# Patient Record
Sex: FEMALE | Race: WHITE | Employment: OTHER | URBAN - METROPOLITAN AREA
[De-identification: names, ages, dates, MRNs, and addresses within clinical notes are randomized per-mention and may not be internally consistent; named-entity substitution may affect disease eponyms.]

---

## 2020-07-09 ENCOUNTER — OFFICE VISIT (OUTPATIENT)
Dept: PRIMARY CARE CLINIC | Age: 65
End: 2020-07-09
Payer: MEDICARE

## 2020-07-09 PROCEDURE — 99211 OFF/OP EST MAY X REQ PHY/QHP: CPT | Performed by: FAMILY MEDICINE

## 2020-07-09 NOTE — PROGRESS NOTES
Fili Chu received a viral test for COVID-19. They were educated on isolation and quarantine as appropriate. For any symptoms, they were directed to seek care from their PCP, given contact information to establish with a doctor, directed to an urgent care or the emergency room.

## 2020-07-13 LAB
SARS-COV-2: NOT DETECTED
SOURCE: NORMAL

## 2020-11-04 ENCOUNTER — TELEPHONE (OUTPATIENT)
Dept: SURGERY | Age: 65
End: 2020-11-04

## 2020-11-04 NOTE — TELEPHONE ENCOUNTER
New Patient referred by Dr. Pippa Davalos  lymph node dissection      Pt has appt on Tuesday November 10  She has a few questions regarding the appt and scheduling surgery-she was told this was a fast growing, concerning melanoma and would like to know if she can speak with Melina Kimbrough

## 2020-11-06 ENCOUNTER — VIRTUAL VISIT (OUTPATIENT)
Dept: SURGERY | Age: 65
End: 2020-11-06
Payer: MEDICARE

## 2020-11-06 PROCEDURE — G8400 PT W/DXA NO RESULTS DOC: HCPCS | Performed by: SURGERY

## 2020-11-06 PROCEDURE — G8428 CUR MEDS NOT DOCUMENT: HCPCS | Performed by: SURGERY

## 2020-11-06 PROCEDURE — 1090F PRES/ABSN URINE INCON ASSESS: CPT | Performed by: SURGERY

## 2020-11-06 PROCEDURE — 1123F ACP DISCUSS/DSCN MKR DOCD: CPT | Performed by: SURGERY

## 2020-11-06 PROCEDURE — 3017F COLORECTAL CA SCREEN DOC REV: CPT | Performed by: SURGERY

## 2020-11-06 PROCEDURE — 99205 OFFICE O/P NEW HI 60 MIN: CPT | Performed by: SURGERY

## 2020-11-06 PROCEDURE — 4040F PNEUMOC VAC/ADMIN/RCVD: CPT | Performed by: SURGERY

## 2020-11-06 RX ORDER — MULTIVITAMIN
1 TABLET ORAL DAILY
COMMUNITY

## 2020-11-06 RX ORDER — MULTIVITAMIN WITH IRON
250 TABLET ORAL DAILY
COMMUNITY

## 2020-11-06 NOTE — PROGRESS NOTES
Marlin Calvillo MD  Surgical Oncology    Date of service: 2020  Shannan Estrada     TELEHEALTH EVALUATION -- Audio/Visual (During IGMMX-87 public health emergency)    HPI:    Shannan Estrada (:  1955) has requested an audio/video evaluation for the following concern(s): Malignant Melanoma    Reason for Consult: Dr Migue Casarez asked me to see Shannan Estrada for evaluation of melanoma of the right upper back. Present Illness: Patient is a 76 y.o.  female presents to her dermatologist for a TBSE. The lesion was approximately the size of a pimple and has been present for at least a month. It has recently increased in size. It has not been bleeding. The lesion has not been pruritic. Patient does not have any other lesions of concern. Biopsy of the lesion was positive for melanoma. No personal or family history of melanoma. Pattricia Pole states not having significant subcutaneous mass, swelling in the neck or axiila, cough, abdominal pain, jaundice, blood in stools, headaches, recent neurological changes or bone pain to indicate any metastatic disease.     Past Medical History:   Diagnosis Date    Hyperlipidemia     Rheumatoid arthritis ([address]      Past Surgical History:   Procedure Laterality Date    EYE SURGERY      cataracts with IOL     Social  Social History     Tobacco Use    Smoking status: Former Smoker     Packs/day: 0.50     Years: 5.00     Pack years: 2.50    Smokeless tobacco: Never Used   Substance Use Topics    Alcohol use: Yes     Comment: 2-5 BEERS PER WEEK    Drug use: No     Family History   Problem Relation Age of Onset    Heart Disease Mother     Cancer Father     Heart Disease Father     High Blood Pressure Sister     High Blood Pressure Brother      Allergies: Sulfa antibiotics and Clindamycin/lincomycin  Current Outpatient Medications   Medication Sig Dispense Refill    magnesium (MAGNESIUM-OXIDE) 250 MG TABS tablet Take 250 mg by mouth daily      Multiple Vitamin (MULTIVITAMIN) The discussion included description of Breslow thickness, melanoma staging and the difference between Breslow thickness and staging. The discussion of the treatment included the techniques necessary for resection or wide local excision and sentinel node biopsy as well as the appropriate applications. These discussions were illustrated with diagrams and the patient was given a printed handout that summarizes the complete discussion. It was felt that this discussion was necessary because of the anxiety induced by the diagnosis of melanoma and the complex information that the patient receive when they attempt to educate themselves by the public sources and/or the internet. The patient was also instructed to avoid excessive sun exposure in the future. Specific techniques for doing this based on the Tayla measures recommended the by [facility] Melanoma [facility] were given. The lesion can be treated with: Wide Excision and [place] Lymph Node Biopsy under General anesthesia. I explained patient about the surgery. All the complications including wound issues were explained. Risks, benefits and alternatives of surgery explained to the patient and patient wishes to proceed with surgery. Adjuvant management will depend on final pathology. All the questions of the patient are answered to her apparent satisfaction. Patient verbalized understanding of the management plan. Pathology reviewed with the patient. I am going to obtain ultrasound of axilla and neck given thickness of melanoma as any positive lymph nodes will change her management. Follow up with Dr. Jennifer Hall. Florentino Church MD  Surgery Attending     An electronic signature was used to authenticate this note.

## 2020-11-06 NOTE — LETTER
[facility]) Surgical Oncology and General Surgery   [address] [place] [address] [phone] f [phone] MD Cristal    SURGERY ORDER -- 20    [facility]ility:  [facility] # _________________                                  Scheduled by: ____________    Surgery Date & Time:   08:30                                           Nuc Med / Inj. - or - Needle/Seed Loc:                    Pt arrival:  06:30 for Given Lymph Node Biopsy    Patient Name: Sangeetha Rudd             :                1955           [facility]:                 [facility]:         [phone] (home)                                                     PROCEDURE:  Back melanoma wide excision and sentinel lymph node excision [id number]    DIAGNOSIS:     ICD-10-CM    1. Malignant melanoma of torso excluding breast ([facility]  C43.59     Right Upper Back     Anesthesia: _GA_ Time Needed: _90 mins_Pt Position: _supine and then prone_         Outpatient _Y_ Admit __  Assist._____  Pre-Op H & P to be done by: PCP     Labs Needed: CBC [] PT/PTT []  INR [] CMP [] EKG [] CXR [] Urine Hcg []     Cardiac Clearance ___  (if Caridad Severs to be done by) __________________    Medications to be stopped 5 days before surgery: None    Additional/Special Orders:    Ancef 2gm IV david Hamilton MD  2020 11:17 AM

## 2020-11-11 ENCOUNTER — HOSPITAL ENCOUNTER (OUTPATIENT)
Dept: ULTRASOUND IMAGING | Age: 65
Discharge: HOME | End: 2020-11-11
Payer: MEDICARE

## 2020-11-11 PROCEDURE — 76882 US LMTD JT/FCL EVL NVASC XTR: CPT

## 2020-11-11 PROCEDURE — 76536 US EXAM OF HEAD AND NECK: CPT

## 2020-11-16 NOTE — PROGRESS NOTES
Joan Fuentes is here to discuss further management of her Invasive Malignant Melanoma of the right upper back and to review recent imaging studies. Patient is followed by Dr. Anthony Baptiste. Patient states new area of concern to her back that has recently started hurting. Denies fever, cough. Headache, nausea and vomiting. No blood in stool. currently. No other complaints. Review of systems is otherwise negative    Past medical history, Past surgical history, Social history, Family history and allergies reviewed and updated. Vitals:    11/17/20 0948   BP: 134/78   Pulse: 68   Resp: 16   Temp: 97 °F (36.1 °C)   SpO2: 98%   Weight: 117 lb (53.1 kg)   Height: 5' 5\" (1.651 m)     Wt Readings from Last 3 Encounters:   11/17/20 117 lb (53.1 kg)   03/12/12 120 lb (54.4 kg)   03/08/12 120 lb (54.4 kg)     Body mass index is 19.47 kg/m². O/E:   Constitutional: Patient is oriented to person, place, and time. No distress. HENT: mucus membranes - moist. No scleral icterus. Neck: Supple and normal range of motion. No lymphadenopathy present. Pulmonary/Chest: Effort normal. No respiratory distress. Abdominal: Soft. No distension and no mass. No tenderness. No Hepatosplenomegaly. Extremities: no edema and no tenderness. Neurological: Grossly intact motor and sensory exam  Skin: Skin is warm and dry. No rash noted. She is not diaphoretic. Biopsy site on back. Psychiatric: She has a normal mood and affect. Her behavior is normal. Judgment and thought content normal.     US EXTREMITY RIGHT / US HEAD NECK SOFT TISSUE THYROID  11/11/20:     1. Right axilla: Normal size and borderline size lymph nodes with normal fatty hilum, none appear to be pathologically enlarged. No biopsy recommended at this time.       2. Right neck soft tissues: Borderline, normal size right neck nodes and no enlarged node. No biopsy recommended at this time.      Pathology:     Thickness: at least 1.8mm   Level: at least Level IV  Ulceration: no Regression: no    Mitotic rate: None Identified     Impression: Invasive Malignant Melanoma     At Least pTa, N0, M0  melanoma of the right upper back. A/P:    Diagnosis Orders   1. Malignant melanoma of torso excluding breast (HCC)       Ultrasound normal. We will proceed with lymph node excision. Plan:    Wide Excision and [place] Lymph Node Biopsy under General anesthesia. I explained patient about the surgery again. All the complications including wound issues were explained. Risks, benefits and alternatives of surgery explained to the patient and patient wishes to proceed with surgery. Adjuvant management will depend on final pathology. All the questions of the patient are answered to her apparent satisfaction. Patient verbalized understanding of the management plan. Discussed possible graft placement. Pathology reviewed with the patient. Follow up with Dr. Jermaine Hodge. Multiple questions answered    Conor Pelletier MD  Surgery Attending    I, Kay Campbell RN, am scribing for and in the presence of Dr Conor Pelletier. Kay Campbell RN    I, Dr. Conor Pelletier, personally performed the services described in this documentation as scribed by Kay Campbell RN in my presence, and it is both accurate and complete.      Conor Pelletier MD  Surgery Attending

## 2020-11-17 ENCOUNTER — TELEPHONE (OUTPATIENT)
Dept: SURGERY | Age: 65
End: 2020-11-17

## 2020-11-17 ENCOUNTER — OFFICE VISIT (OUTPATIENT)
Dept: SURGERY | Age: 65
End: 2020-11-17
Payer: MEDICARE

## 2020-11-17 VITALS
HEART RATE: 68 BPM | OXYGEN SATURATION: 98 % | WEIGHT: 117 LBS | TEMPERATURE: 97 F | RESPIRATION RATE: 16 BRPM | BODY MASS INDEX: 19.49 KG/M2 | SYSTOLIC BLOOD PRESSURE: 134 MMHG | HEIGHT: 65 IN | DIASTOLIC BLOOD PRESSURE: 78 MMHG

## 2020-11-17 PROCEDURE — 1090F PRES/ABSN URINE INCON ASSESS: CPT | Performed by: SURGERY

## 2020-11-17 PROCEDURE — G8484 FLU IMMUNIZE NO ADMIN: HCPCS | Performed by: SURGERY

## 2020-11-17 PROCEDURE — 3017F COLORECTAL CA SCREEN DOC REV: CPT | Performed by: SURGERY

## 2020-11-17 PROCEDURE — 4040F PNEUMOC VAC/ADMIN/RCVD: CPT | Performed by: SURGERY

## 2020-11-17 PROCEDURE — 1036F TOBACCO NON-USER: CPT | Performed by: SURGERY

## 2020-11-17 PROCEDURE — G8427 DOCREV CUR MEDS BY ELIG CLIN: HCPCS | Performed by: SURGERY

## 2020-11-17 PROCEDURE — 99213 OFFICE O/P EST LOW 20 MIN: CPT | Performed by: SURGERY

## 2020-11-17 PROCEDURE — G8420 CALC BMI NORM PARAMETERS: HCPCS | Performed by: SURGERY

## 2020-11-17 PROCEDURE — 1123F ACP DISCUSS/DSCN MKR DOCD: CPT | Performed by: SURGERY

## 2020-11-17 PROCEDURE — G8400 PT W/DXA NO RESULTS DOC: HCPCS | Performed by: SURGERY

## 2020-11-17 NOTE — TELEPHONE ENCOUNTER
Jonathon Savage, With Dr. Otto Perez office, would like to know if Dr. Georgia Sullivan needs to order an EKG or blood work pre op.     If blood work is needed they need to know what kind and they will get orders in     Please call Dr. Otto Perez office back at [phone]

## 2020-11-23 ENCOUNTER — TELEPHONE (OUTPATIENT)
Dept: SURGERY | Age: 65
End: 2020-11-23

## 2020-11-25 ENCOUNTER — TELEPHONE (OUTPATIENT)
Dept: SURGERY | Age: 65
End: 2020-11-25

## 2020-11-25 DIAGNOSIS — C43.59 MALIGNANT MELANOMA OF TORSO EXCLUDING BREAST (HCC): Primary | ICD-10-CM

## 2020-11-25 NOTE — PROGRESS NOTES
[facility] is here to discuss further management of her Invasive Malignant Melanoma of the right upper back and to review recent imaging studies. Patient is followed by Dr. Epi Stein. Patient states new area of concern to her back that has recently started hurting. Denies fever, cough. Headache, nausea and vomiting. No blood in stool. currently. No other complaints. Review of systems is otherwise negative    Past medical history, Past surgical history, Social history, Family history and allergies reviewed and updated. There were no vitals filed for this visit. Wt Readings from Last 3 Encounters:   11/17/20 117 lb (53.1 kg)   03/12/12 120 lb (54.4 kg)   03/08/12 120 lb (54.4 kg)     There is no height or weight on file to calculate BMI. O/E:   Constitutional: Patient is oriented to person, place, and time. No distress. HENT: mucus membranes - moist. No scleral icterus. Neck: Supple and normal range of motion. No lymphadenopathy present. Pulmonary/Chest: Effort normal. No respiratory distress. Abdominal: Soft. No distension and no mass. No tenderness. No Hepatosplenomegaly. Extremities: no edema and no tenderness. Neurological: Grossly intact motor and sensory exam  Skin: Skin is warm and dry. No rash noted. She is not diaphoretic. Psychiatric: She has a normal mood and affect. Her behavior is normal. Judgment and thought content normal.     US EXTREMITY RIGHT / US HEAD NECK SOFT TISSUE THYROID  11/11/20:     1. Right axilla: Normal size and borderline size lymph nodes with normal fatty hilum, none appear to be pathologically enlarged. No biopsy recommended at this time.       2. Right neck soft tissues: Borderline, normal size right neck nodes and no enlarged node. No biopsy recommended at this time.      Pathology:     Thickness: at least 1.8mm   Level: at least Level IV  Ulceration: no     Regression: no    Mitotic rate: None Identified     Impression: Invasive Malignant Melanoma     At Least pTa, and it is both accurate and complete.      Patrica Sandoval MD  Surgery Attending

## 2020-11-30 ENCOUNTER — NURSE ONLY (OUTPATIENT)
Dept: PRIMARY CARE CLINIC | Age: 65
End: 2020-11-30
Payer: MEDICARE

## 2020-11-30 PROCEDURE — 99211 OFF/OP EST MAY X REQ PHY/QHP: CPT | Performed by: NURSE PRACTITIONER

## 2020-11-30 NOTE — PROGRESS NOTES
[facility] / [address] [facility], [address]    Acknowledgment of Informed Consent for Surgical or Medical Procedure and Sedation  I agree to allow doctor(s) Aleyda Rodriguez and his/her associates or assistants, including residents and/or other qualified medical practitioner to perform the following medical treatment or procedure and to administer or direct the administration of sedation as necessary:  Procedure(s): Marlene Levin 99  My doctor has explained the following regarding the proposed procedure:   the explanation of the procedure   the benefits of the procedure   the potential problems that might occur during recuperation   the risks and side effects of the procedure which could include but are not limited to severe blood loss, infection, stroke or death   the benefits, risks and side effect of alternative procedures including the consequences of declining this procedure or any alternative procedures   the likelihood of achieving satisfactory results. I acknowledge no guarantee or assurance has been made to me regarding the results. I understand that during the course of this treatment/procedure, unforeseen conditions can occur which require an additional or different procedure. I agree to allow my physician or assistants to perform such extension of the original procedure as they may find necessary. I understand that sedation will often result in temporary impairment of memory and fine motor skills and that sedation can occasionally progress to a state of deep sedation or general anesthesia. I understand the risks of anesthesia for surgery include, but are not limited to, sore throat, hoarseness, injury to face, mouth, or teeth; nausea; headache; injury to blood vessels or nerves; death, brain damage, or paralysis.     I understand that if I have a Limitation of Treatment order in effect during my hospitalization, the order may or may not be in effect during this procedure. I give my doctor permission to give me blood or blood products. I understand that there are risks with receiving blood such as hepatitis, AIDS, fever, or allergic reaction. I acknowledge that the risks, benefits, and alternatives of this treatment have been explained to me and that no express or implied warranty has been given by the hospital, any blood bank, or any person or entity as to the blood or blood components transfused. At the discretion of my doctor, I agree to allow observers, equipment/product representatives and allow photographing, and/or televising of the procedure, provided my name or identity is maintained confidentially. I agree the hospital may dispose of or use for scientific or educational purposes any tissue, fluid, or body parts which may be removed.     ________________________________Date________Time______ am/pm  ([place] [facility])  Patient or Signature of Closest Relative or Legal Guardian    ________________________________Date________Time______am/pm      Page 1 of  1  Witness

## 2020-12-01 LAB — SARS-COV-2: NOT DETECTED

## 2020-12-02 NOTE — PROGRESS NOTES
[facility] patients having surgery or anesthesia are required to be Covid tested. You will need to quarantined from the time you are tested until your surgery. PRIOR TO PROCEDURE DATE:  1. Please follow any guidelines/instructions prior to your procedure as advised by your surgeon. 2. Arrange for someone to drive you home and be with you for the first 24 hours after discharge for your safety after your procedure for which you received sedation. Ensure it is someone we can share information with regarding your discharge. 3. You must contact your surgeon for instructions IF:   You are taking any blood thinners, aspirin, anti-inflammatory or vitamin E.   There is a change in your physical condition such as a cold, fever, rash, cuts, sores or any other infection, especially near your surgical site. 4. Do not drink alcohol the day before or day of your procedure. 5. A Pre-op History and Physical for surgery MUST be completed by your Physician or Urgent Care within 30 days of your procedure date. Please bring a copy with you on the day of your procedure and along with any other testing performed. THE DAY OF YOUR PROCEDURE:  1. Follow instructions for ARRIVAL TIME as DIRECTED BY YOUR SURGEON. I    2. Enter the MAIN entrance from [place] and follow the signs to the free [employer] or [employer] parking (offered free of charge 6am-5pm). 3. Enter the Main Entrance of the hospital (do not enter from the lower level of the parking garage). Upon entrance, check in with the  at the main desk on your left. If no one is available at the desk, proceed into the [facility] Waiting Room and go through the door directly into the [facility]. There is a Check-in desk ACROSS from Room 5 (marked with a sign hanging from the ceiling). The phone number for the surgery center is [phone].     4. Please call [phone] option #2 option #2 if you have not been preregistered yet. On the day of your procedure bring your insurance card and photo ID. You will be registered at your bedside once brought back to your room. 5. DO NOT EAT ANYTHING eight hours prior to surgery. May have 8 ounces of water 4 hours prior to surgery. 6. MEDICATIONS    Take the following medications with a SMALL sip of water:    Use your usual dose of inhalers the morning of surgery. BRING your rescue inhaler with you to hospital.    Anesthesia does NOT want you to take insulin the morning of surgery. They will control your blood sugar while you are at the hospital. Please contact your ordering physician for instructions regarding your insulin the night before your procedure. If you have an insulin pump, please keep it set on basal rate. 7. Do not swallow water when brushing teeth. No gum, candy, mints or ice chips. Refrain from smoking or at least decrease the amount. 8. Dress in loose, comfortable clothing appropriate for redressing after your procedure. Do not wear jewelry (including body piercings), make-up (especially NO eye make-up), fingernail polish (NO toenail polish if foot/leg surgery), lotion, powders or metal hairclips. 9. Dentures, glasses, or contacts will need to be removed before your procedure. Bring cases for your glasses, contacts, dentures, or hearing aids to protect them while you are in surgery. 10. If you use a CPAP, please bring it with you on the day of your procedure. 11. We recommend that valuable personal  belongings such as cash, cell phones, e-tablets or jewelry, be left at home during your stay. The hospital will not be responsible for valuables that are not secured in the hospital safe. However, if your insurance requires a co-pay, you may want to bring a method of payment, i.e. Check or credit card, if you wish to pay your co-pay the day of surgery.       12. If you are to stay overnight, you may bring a bag with personal items. Please have any large items you may need brought in by your family after your arrival to your hospital room. 15. If you have a Living Will or Durable Power of , please bring a copy on the day of your procedure. 15. With your permission, one family member may accompany you while you are being prepared for surgery. Once you are ready, additional family members may join you. HOW WE KEEP YOU SAFE and WORK TO PREVENT SURGICAL SITE INFECTIONS:  1. Health care workers should always check your ID bracelet to verify your name and birth date. You will be asked many times to state your name, date of birth, and allergies. 2. Health care workers should always clean their hands with soap or alcohol gel before providing care to you. It is okay to ask anyone if they cleaned their hands before they touch you. 3. You will be actively involved in verifying the type of procedure you are having and ensuring the correct surgical site. This will be confirmed multiple times prior to your procedure. Do NOT susan your surgery site UNLESS instructed to by your surgeon. 4. Do not shave or wax for 72 hours prior to procedure near your operative site. Shaving with a razor can irritate your skin and make it easier to develop an infection. On the day of your procedure, any hair that needs to be removed near the surgical site will be clipped by a healthcare worker using a special clippers designed to avoid skin irritation. 5. When you are in the operating room, your surgical site will be cleansed with a special soap, and in most cases, you will be given an antibiotic before the surgery begins. What to expect AFTER YOUR PROCEDURE:  1. Immediately following your procedure, your will be taken to the PACU for the first phase of your recovery.   Your nurse will help you recover from any potential side effects of anesthesia, such as extreme drowsiness, changes in your vital signs or breathing patterns. Nausea, headache, muscle aches, or sore throat may also occur after anesthesia. Your nurse will help you manage these potential side effects. 2. For comfort and safety, arrange to have someone at home with you for the first 24 hours after discharge. 3. You and your family will be given written instructions about your diet, activity, dressing care, medications, and return visits. 4. Once at home, should issues with nausea, pain, or bleeding occur, or should you notice any signs of infection, you should call your surgeon. 5. Always clean your hands before and after caring for your wound. Do not let your family touch your surgery site without cleaning their hands. 6. Narcotic pain medications can cause significant constipation. You may want to add a stool softener to your postoperative medication schedule or speak to your surgeon on how best to manage this SIDE EFFECT. SPECIAL INSTRUCTIONS    Thank you for allowing us to care for you. We strive to exceed your expectations in the delivery of care and service provided to you and your family. If you need to contact us for any reason, please call us at [phone]    Instructions reviewed with patient during preadmission testing phone interview. Moira Santana. 12/2/2020 .10:29 AM      ADDITIONAL EDUCATIONAL INFORMATION REVIEWED PER PHONE WITH YOU AND/OR YOUR FAMILY:  Yes Antibacterial Soap

## 2020-12-03 ENCOUNTER — ANESTHESIA EVENT (OUTPATIENT)
Dept: OPERATING ROOM | Age: 65
End: 2020-12-03
Payer: MEDICARE

## 2020-12-04 ENCOUNTER — HOSPITAL ENCOUNTER (OUTPATIENT)
Age: 65
Setting detail: OUTPATIENT SURGERY
Discharge: HOME | End: 2020-12-04
Attending: SURGERY | Admitting: SURGERY
Payer: MEDICARE

## 2020-12-04 ENCOUNTER — HOSPITAL ENCOUNTER (OUTPATIENT)
Dept: NUCLEAR MEDICINE | Age: 65
Discharge: HOME | End: 2020-12-04
Payer: MEDICARE

## 2020-12-04 ENCOUNTER — ANESTHESIA (OUTPATIENT)
Dept: OPERATING ROOM | Age: 65
End: 2020-12-04
Payer: MEDICARE

## 2020-12-04 VITALS — TEMPERATURE: 94.6 F | OXYGEN SATURATION: 100 % | SYSTOLIC BLOOD PRESSURE: 183 MMHG | DIASTOLIC BLOOD PRESSURE: 86 MMHG

## 2020-12-04 VITALS
TEMPERATURE: 96.8 F | SYSTOLIC BLOOD PRESSURE: 129 MMHG | WEIGHT: 117 LBS | HEART RATE: 57 BPM | RESPIRATION RATE: 18 BRPM | OXYGEN SATURATION: 100 % | HEIGHT: 65 IN | BODY MASS INDEX: 19.49 KG/M2 | DIASTOLIC BLOOD PRESSURE: 73 MMHG

## 2020-12-04 PROCEDURE — 88341 IMHCHEM/IMCYTCHM EA ADD ANTB: CPT

## 2020-12-04 PROCEDURE — 11606 EXC TR-EXT MAL+MARG >4 CM: CPT | Performed by: SURGERY

## 2020-12-04 PROCEDURE — 7100000001 HC PACU RECOVERY - ADDTL 15 MIN: Performed by: SURGERY

## 2020-12-04 PROCEDURE — 3600000002 HC SURGERY LEVEL 2 BASE: Performed by: SURGERY

## 2020-12-04 PROCEDURE — 38525 BIOPSY/REMOVAL LYMPH NODES: CPT | Performed by: SURGERY

## 2020-12-04 PROCEDURE — 13101 CMPLX RPR TRUNK 2.6-7.5 CM: CPT | Performed by: SURGERY

## 2020-12-04 PROCEDURE — 7100000011 HC PHASE II RECOVERY - ADDTL 15 MIN: Performed by: SURGERY

## 2020-12-04 PROCEDURE — 3700000001 HC ADD 15 MINUTES (ANESTHESIA): Performed by: SURGERY

## 2020-12-04 PROCEDURE — 88305 TISSUE EXAM BY PATHOLOGIST: CPT

## 2020-12-04 PROCEDURE — 6360000002 HC RX W HCPCS: Performed by: SURGERY

## 2020-12-04 PROCEDURE — 78195 LYMPH SYSTEM IMAGING: CPT

## 2020-12-04 PROCEDURE — 88307 TISSUE EXAM BY PATHOLOGIST: CPT

## 2020-12-04 PROCEDURE — 7100000000 HC PACU RECOVERY - FIRST 15 MIN: Performed by: SURGERY

## 2020-12-04 PROCEDURE — 3600000012 HC SURGERY LEVEL 2 ADDTL 15MIN: Performed by: SURGERY

## 2020-12-04 PROCEDURE — 2500000003 HC RX 250 WO HCPCS: Performed by: SURGERY

## 2020-12-04 PROCEDURE — 2500000003 HC RX 250 WO HCPCS: Performed by: NURSE ANESTHETIST, CERTIFIED REGISTERED

## 2020-12-04 PROCEDURE — A9541 TC99M SULFUR COLLOID: HCPCS | Performed by: SURGERY

## 2020-12-04 PROCEDURE — 13102 CMPLX RPR TRUNK ADDL 5CM/<: CPT | Performed by: SURGERY

## 2020-12-04 PROCEDURE — 3430000000 HC RX DIAGNOSTIC RADIOPHARMACEUTICAL: Performed by: SURGERY

## 2020-12-04 PROCEDURE — 88342 IMHCHEM/IMCYTCHM 1ST ANTB: CPT

## 2020-12-04 PROCEDURE — 38900 IO MAP OF SENT LYMPH NODE: CPT | Performed by: SURGERY

## 2020-12-04 PROCEDURE — 7100000010 HC PHASE II RECOVERY - FIRST 15 MIN: Performed by: SURGERY

## 2020-12-04 PROCEDURE — 6360000002 HC RX W HCPCS: Performed by: NURSE ANESTHETIST, CERTIFIED REGISTERED

## 2020-12-04 PROCEDURE — 2709999900 HC NON-CHARGEABLE SUPPLY: Performed by: SURGERY

## 2020-12-04 PROCEDURE — 2580000003 HC RX 258: Performed by: ANESTHESIOLOGY

## 2020-12-04 PROCEDURE — 2580000003 HC RX 258: Performed by: SURGERY

## 2020-12-04 PROCEDURE — 3700000000 HC ANESTHESIA ATTENDED CARE: Performed by: SURGERY

## 2020-12-04 RX ORDER — FENTANYL CITRATE 50 UG/ML
50 INJECTION, SOLUTION INTRAMUSCULAR; INTRAVENOUS EVERY 5 MIN PRN
Status: DISCONTINUED | OUTPATIENT
Start: 2020-12-04 | End: 2020-12-04 | Stop reason: HOSPADM

## 2020-12-04 RX ORDER — SODIUM CHLORIDE 0.9 % (FLUSH) 0.9 %
10 SYRINGE (ML) INJECTION EVERY 12 HOURS SCHEDULED
Status: DISCONTINUED | OUTPATIENT
Start: 2020-12-04 | End: 2020-12-04 | Stop reason: HOSPADM

## 2020-12-04 RX ORDER — LIDOCAINE HYDROCHLORIDE 10 MG/ML
1 INJECTION, SOLUTION EPIDURAL; INFILTRATION; INTRACAUDAL; PERINEURAL
Status: DISCONTINUED | OUTPATIENT
Start: 2020-12-04 | End: 2020-12-04 | Stop reason: HOSPADM

## 2020-12-04 RX ORDER — HYDRALAZINE HYDROCHLORIDE 20 MG/ML
5 INJECTION INTRAMUSCULAR; INTRAVENOUS EVERY 5 MIN PRN
Status: DISCONTINUED | OUTPATIENT
Start: 2020-12-04 | End: 2020-12-04 | Stop reason: HOSPADM

## 2020-12-04 RX ORDER — ROCURONIUM BROMIDE 10 MG/ML
INJECTION, SOLUTION INTRAVENOUS PRN
Status: DISCONTINUED | OUTPATIENT
Start: 2020-12-04 | End: 2020-12-04 | Stop reason: SDUPTHER

## 2020-12-04 RX ORDER — SODIUM CHLORIDE 0.9 % (FLUSH) 0.9 %
10 SYRINGE (ML) INJECTION PRN
Status: DISCONTINUED | OUTPATIENT
Start: 2020-12-04 | End: 2020-12-04 | Stop reason: HOSPADM

## 2020-12-04 RX ORDER — PROPOFOL 10 MG/ML
INJECTION, EMULSION INTRAVENOUS PRN
Status: DISCONTINUED | OUTPATIENT
Start: 2020-12-04 | End: 2020-12-04 | Stop reason: SDUPTHER

## 2020-12-04 RX ORDER — LABETALOL HYDROCHLORIDE 5 MG/ML
5 INJECTION, SOLUTION INTRAVENOUS EVERY 10 MIN PRN
Status: DISCONTINUED | OUTPATIENT
Start: 2020-12-04 | End: 2020-12-04 | Stop reason: HOSPADM

## 2020-12-04 RX ORDER — SODIUM CHLORIDE, SODIUM LACTATE, POTASSIUM CHLORIDE, CALCIUM CHLORIDE 600; 310; 30; 20 MG/100ML; MG/100ML; MG/100ML; MG/100ML
INJECTION, SOLUTION INTRAVENOUS CONTINUOUS
Status: DISCONTINUED | OUTPATIENT
Start: 2020-12-04 | End: 2020-12-04 | Stop reason: HOSPADM

## 2020-12-04 RX ORDER — LIDOCAINE HYDROCHLORIDE 20 MG/ML
INJECTION, SOLUTION INFILTRATION; PERINEURAL PRN
Status: DISCONTINUED | OUTPATIENT
Start: 2020-12-04 | End: 2020-12-04 | Stop reason: SDUPTHER

## 2020-12-04 RX ORDER — OXYCODONE HYDROCHLORIDE 5 MG/1
5 TABLET ORAL EVERY 6 HOURS PRN
Qty: 28 TABLET | Refills: 0 | Status: SHIPPED | OUTPATIENT
Start: 2020-12-04 | End: 2020-12-11

## 2020-12-04 RX ORDER — ENALAPRILAT 2.5 MG/2ML
1.25 INJECTION INTRAVENOUS
Status: DISCONTINUED | OUTPATIENT
Start: 2020-12-04 | End: 2020-12-04 | Stop reason: HOSPADM

## 2020-12-04 RX ORDER — CEFAZOLIN SODIUM 2 G/50ML
2 SOLUTION INTRAVENOUS ONCE
Status: COMPLETED | OUTPATIENT
Start: 2020-12-04 | End: 2020-12-04

## 2020-12-04 RX ORDER — MAGNESIUM HYDROXIDE 1200 MG/15ML
LIQUID ORAL CONTINUOUS PRN
Status: COMPLETED | OUTPATIENT
Start: 2020-12-04 | End: 2020-12-04

## 2020-12-04 RX ORDER — FENTANYL CITRATE 50 UG/ML
INJECTION, SOLUTION INTRAMUSCULAR; INTRAVENOUS PRN
Status: DISCONTINUED | OUTPATIENT
Start: 2020-12-04 | End: 2020-12-04 | Stop reason: SDUPTHER

## 2020-12-04 RX ORDER — FENTANYL CITRATE 50 UG/ML
25 INJECTION, SOLUTION INTRAMUSCULAR; INTRAVENOUS EVERY 5 MIN PRN
Status: DISCONTINUED | OUTPATIENT
Start: 2020-12-04 | End: 2020-12-04 | Stop reason: HOSPADM

## 2020-12-04 RX ORDER — ONDANSETRON 2 MG/ML
4 INJECTION INTRAMUSCULAR; INTRAVENOUS
Status: DISCONTINUED | OUTPATIENT
Start: 2020-12-04 | End: 2020-12-04 | Stop reason: HOSPADM

## 2020-12-04 RX ORDER — BUPIVACAINE HYDROCHLORIDE AND EPINEPHRINE 5; 5 MG/ML; UG/ML
INJECTION, SOLUTION EPIDURAL; INTRACAUDAL; PERINEURAL PRN
Status: DISCONTINUED | OUTPATIENT
Start: 2020-12-04 | End: 2020-12-04 | Stop reason: ALTCHOICE

## 2020-12-04 RX ADMIN — LIDOCAINE HYDROCHLORIDE 100 MG: 20 INJECTION, SOLUTION INFILTRATION; PERINEURAL at 09:18

## 2020-12-04 RX ADMIN — SODIUM CHLORIDE, POTASSIUM CHLORIDE, SODIUM LACTATE AND CALCIUM CHLORIDE: 600; 310; 30; 20 INJECTION, SOLUTION INTRAVENOUS at 09:10

## 2020-12-04 RX ADMIN — CEFAZOLIN SODIUM 2 G: 2 SOLUTION INTRAVENOUS at 09:25

## 2020-12-04 RX ADMIN — PROPOFOL 200 MG: 10 INJECTION, EMULSION INTRAVENOUS at 09:18

## 2020-12-04 RX ADMIN — SUGAMMADEX 200 MG: 100 INJECTION, SOLUTION INTRAVENOUS at 10:40

## 2020-12-04 RX ADMIN — ROCURONIUM BROMIDE 50 MG: 10 INJECTION INTRAVENOUS at 09:18

## 2020-12-04 RX ADMIN — FENTANYL CITRATE 100 MCG: 50 INJECTION INTRAMUSCULAR; INTRAVENOUS at 09:18

## 2020-12-04 RX ADMIN — Medication 0.8 MILLICURIE: at 07:35

## 2020-12-04 ASSESSMENT — PULMONARY FUNCTION TESTS
PIF_VALUE: 19
PIF_VALUE: 16
PIF_VALUE: 17
PIF_VALUE: 16
PIF_VALUE: 16
PIF_VALUE: 18
PIF_VALUE: 16
PIF_VALUE: 13
PIF_VALUE: 14
PIF_VALUE: 16
PIF_VALUE: 16
PIF_VALUE: 18
PIF_VALUE: 18
PIF_VALUE: 16
PIF_VALUE: 18
PIF_VALUE: 17
PIF_VALUE: 19
PIF_VALUE: 18
PIF_VALUE: 16
PIF_VALUE: 16
PIF_VALUE: 0
PIF_VALUE: 15
PIF_VALUE: 16
PIF_VALUE: 1
PIF_VALUE: 16
PIF_VALUE: 17
PIF_VALUE: 18
PIF_VALUE: 19
PIF_VALUE: 16
PIF_VALUE: 19
PIF_VALUE: 17
PIF_VALUE: 18
PIF_VALUE: 15
PIF_VALUE: 18
PIF_VALUE: 15
PIF_VALUE: 14
PIF_VALUE: 17
PIF_VALUE: 18
PIF_VALUE: 17
PIF_VALUE: 16
PIF_VALUE: 15
PIF_VALUE: 15
PIF_VALUE: 19
PIF_VALUE: 1
PIF_VALUE: 16
PIF_VALUE: 18
PIF_VALUE: 16
PIF_VALUE: 19
PIF_VALUE: 18
PIF_VALUE: 16
PIF_VALUE: 15
PIF_VALUE: 16
PIF_VALUE: 15
PIF_VALUE: 14
PIF_VALUE: 19
PIF_VALUE: 18
PIF_VALUE: 16
PIF_VALUE: 18
PIF_VALUE: 19
PIF_VALUE: 14
PIF_VALUE: 19
PIF_VALUE: 18
PIF_VALUE: 16
PIF_VALUE: 19
PIF_VALUE: 16
PIF_VALUE: 19
PIF_VALUE: 16
PIF_VALUE: 19
PIF_VALUE: 16
PIF_VALUE: 19
PIF_VALUE: 1
PIF_VALUE: 16
PIF_VALUE: 18
PIF_VALUE: 17
PIF_VALUE: 19
PIF_VALUE: 27
PIF_VALUE: 19
PIF_VALUE: 15
PIF_VALUE: 19
PIF_VALUE: 16
PIF_VALUE: 15
PIF_VALUE: 3
PIF_VALUE: 18
PIF_VALUE: 17
PIF_VALUE: 15
PIF_VALUE: 0
PIF_VALUE: 18
PIF_VALUE: 2
PIF_VALUE: 19
PIF_VALUE: 16
PIF_VALUE: 19
PIF_VALUE: 14
PIF_VALUE: 18
PIF_VALUE: 16
PIF_VALUE: 3
PIF_VALUE: 17
PIF_VALUE: 1
PIF_VALUE: 15
PIF_VALUE: 0
PIF_VALUE: 16
PIF_VALUE: 18
PIF_VALUE: 16
PIF_VALUE: 15
PIF_VALUE: 16
PIF_VALUE: 15
PIF_VALUE: 1
PIF_VALUE: 19
PIF_VALUE: 16
PIF_VALUE: 13
PIF_VALUE: 1
PIF_VALUE: 14
PIF_VALUE: 16
PIF_VALUE: 14
PIF_VALUE: 15
PIF_VALUE: 14
PIF_VALUE: 0

## 2020-12-04 ASSESSMENT — PAIN DESCRIPTION - ORIENTATION: ORIENTATION: RIGHT

## 2020-12-04 ASSESSMENT — PAIN SCALES - GENERAL: PAINLEVEL_OUTOF10: 4

## 2020-12-04 ASSESSMENT — PAIN DESCRIPTION - PROGRESSION: CLINICAL_PROGRESSION: NOT CHANGED

## 2020-12-04 ASSESSMENT — PAIN - FUNCTIONAL ASSESSMENT
PAIN_FUNCTIONAL_ASSESSMENT: ACTIVITIES ARE NOT PREVENTED
PAIN_FUNCTIONAL_ASSESSMENT: 0-10

## 2020-12-04 ASSESSMENT — PAIN DESCRIPTION - ONSET: ONSET: ON-GOING

## 2020-12-04 ASSESSMENT — PAIN DESCRIPTION - FREQUENCY: FREQUENCY: INTERMITTENT

## 2020-12-04 ASSESSMENT — PAIN DESCRIPTION - DESCRIPTORS: DESCRIPTORS: ACHING

## 2020-12-04 ASSESSMENT — PAIN DESCRIPTION - LOCATION: LOCATION: SHOULDER

## 2020-12-04 NOTE — ANESTHESIA PRE PROCEDURE
Department of Anesthesiology  Preprocedure Note       Name:  Sangeetha Rudd   Age:  76 y.o.  :  1955                                          MRN:  [id number]         Date:  2020      Surgeon: Cheryl Jeter):  Joaquina Hamilton MD    Procedure: Procedure(s):  BACK MELANOMA WIDE EXCISION AND SENTINEL LYMPH NODE EXCISION    Medications prior to admission:   Prior to Admission medications    Medication Sig Start Date End Date Taking? Authorizing Provider   magnesium (MAGNESIUM-OXIDE) 250 MG TABS tablet Take 250 mg by mouth daily   Yes Historical Provider, MD   pravastatin (PRAVACHOL) 20 MG tablet TAKE ONE TABLET BY MOUTH EVERY DAY 3/14/13  Yes Bharathi Romero MD   methotrexate [facility]) 2.5 MG chemo tablet  10/19/20   Historical Provider, MD   Multiple Vitamin (MULTIVITAMIN) tablet Take 1 tablet by mouth daily    Historical Provider, MD       Current medications:    Current Facility-Administered Medications   Medication Dose Route Frequency Provider Last Rate Last Dose    ceFAZolin (ANCEF) 2 g in dextrose 3 % 50 mL IVPB (duplex)  2 g Intravenous Once Joaquina Hamilton MD        lactated ringers infusion   Intravenous Continuous Guillermina Ervin DO        lactated ringers infusion   Intravenous Continuous Jaden Ravi MD        sodium chloride flush 0.9 % injection 10 mL  10 mL Intravenous 2 times per day Jaden Ravi MD        sodium chloride flush 0.9 % injection 10 mL  10 mL Intravenous PRN Jaden Ravi MD        lidocaine PF 1 % injection 1 mL  1 mL Intradermal Once PRN Jaden Ravi MD         Facility-Administered Medications Ordered in Other Encounters   Medication Dose Route Frequency Provider Last Rate Last Dose    technetium filtered sulfur colloid solution 0.8 millicurie  0.8 millicurie Intravenous ONCE PRN Joaquina Hamilton MD           Allergies:     Allergies   Allergen Reactions    Sulfa Antibiotics      swelling    Clindamycin/Lincomycin Rash     Turned red all over, rash lasted for weeks. Problem List:    Patient Active Problem List   Diagnosis Code    Hyperlipidemia E78.5    Shortness of breath R06.02    Family history of coronary artery disease Z82.49    Weight loss R63.4    Chest pain R07.9    Rheumatoid arthritis ([facility] [address] M06.9       Past Medical History:        Diagnosis Date    Cancer ([facility] [address]     shin    Hyperlipidemia     [facility]        Past Surgical History:        Procedure Laterality Date    BREAST SURGERY      COLONOSCOPY      EYE SURGERY      cataracts with IOL    HAND SURGERY         Social History:    Social History     Tobacco Use    Smoking status: Former Smoker     Packs/day: 0.50     Years: 5.00     Pack years: 2.50     Last attempt to quit: 1978     Years since quittin.0    Smokeless tobacco: Never Used   Substance Use Topics    Alcohol use: Yes     Alcohol/week: 7.0 standard drinks     Types: 7 Glasses of wine per week                                Counseling given: Not Answered      Vital Signs (Current):   Vitals:    20 1025 20 0704 20 0708   BP:   (!) 152/84   Pulse:   67   Resp:   16   Temp:   98.2 °F (36.8 °C)   TempSrc:   Oral   SpO2:   100%   Weight: 117 lb (53.1 kg) 117 lb (53.1 kg) 117 lb (53.1 kg)   Height: 5' 5\" (1.651 m) 5' 5\" (1.651 m) 5' 5\" (1.651 m)                                              BP Readings from Last 3 Encounters:   20 (!) 152/84   20 134/78   12 123/73       NPO Status: Time of last liquid consumption:                         Time of last solid consumption:                         Date of last liquid consumption: 20                        Date of last solid food consumption: 20    BMI:   Wt Readings from Last 3 Encounters:   20 117 lb (53.1 kg)   20 117 lb (53.1 kg)   12 120 lb (54.4 kg)     Body mass index is 19.47 kg/m².     CBC: No results found for: WBC, RBC, HGB, HCT, MCV, RDW, PLT    CMP:   Lab Results   Component Value Date     02/22/2012    K 4.9 02/22/2012     02/22/2012    CO2 29 02/22/2012    BUN 14 02/22/2012    CREATININE 1.03 02/22/2012    LABGLOM 60 02/22/2012    GLUCOSE 86 02/22/2012    CALCIUM 10.5 02/22/2012    AST 17 02/22/2012    ALT 20 02/22/2012       POC Tests: No results for input(s): POCGLU, POCNA, POCK, POCCL, POCBUN, POCHEMO, POCHCT in the last 72 hours. Coags: No results found for: PROTIME, INR, APTT    HCG (If Applicable): No results found for: PREGTESTUR, PREGSERUM, HCG, HCGQUANT     ABGs: No results found for: PHART, PO2ART, OLP0MRI, RZR4TKK, BEART, Q7AKXTLK     Type & Screen (If Applicable):  No results found for: LABABO, LABRH    Drug/Infectious Status (If Applicable):  No results found for: HIV, HEPCAB    COVID-19 Screening (If Applicable):   Lab Results   Component Value Date    COVID19 Not Detected 11/30/2020         Anesthesia Evaluation  Patient summary reviewed and Nursing notes reviewed no history of anesthetic complications:   Airway: Mallampati: I  TM distance: >3 FB   Neck ROM: full  Mouth opening: > = 3 FB Dental: normal exam         Pulmonary:Negative Pulmonary ROS                              Cardiovascular:Negative CV ROS                      Neuro/Psych:   Negative Neuro/Psych ROS              GI/Hepatic/Renal: Neg GI/Hepatic/Renal ROS            Endo/Other:    (+) : arthritis: rheumatoid. , .                  ROS comment: Melanoma  Abdominal:           Vascular: negative vascular ROS. Anesthesia Plan      general     ASA 2       Induction: intravenous. Anesthetic plan and risks discussed with patient.                       Karley Henning MD   12/4/2020

## 2020-12-04 NOTE — PROGRESS NOTES
Pt admitted to PACU 13, BS report received per Arlin English CRNA. Pt responsive but not fully awake. VSS on arrival. Pt breathing easy and unlabored on RA. Pt denies pain. R axilla CDI with surgical glue. Dressing intact to R upper back, scant amount drainage noted. IVF infusing, site unremarkable. Will cont to monitor.

## 2020-12-04 NOTE — PROGRESS NOTES
PACU Transfer to [facility]  Pt's Current Allergies: Sulfa antibiotics and Clindamycin/lincomycin    Pt meets criteria to transfer to next phase of care per AMANDA SCORE and ASPAN standards        Vitals:    12/04/20 1145   BP: (!) 145/76   Pulse: 55   Resp: 10   Temp: 96.7 °F (35.9 °C)   SpO2: 100%      BP within 20% of pt's admitting BP as per Amanda Score      Intake/Output Summary (Last 24 hours) at 12/4/2020 1201  Last data filed at 12/4/2020 1113  Gross per 24 hour   Intake 1000 ml   Output --   Net 1000 ml         Pain assessment:  none  Pain Level: 0    Patient was assessed for unknown alterations to skin integrity. There were not unknown alterations observed. Patient transferred to care of Luke Bender RN.   Family updated and directed to Luke Bender    12/4/2020 12:01 PM

## 2020-12-04 NOTE — PROGRESS NOTES
Patient states pain is 4/10 in right shoulder blade is tolerable at this level declines pain medication. Tolerating coffee at this time.

## 2020-12-04 NOTE — ANESTHESIA POSTPROCEDURE EVALUATION
Department of Anesthesiology  Postprocedure Note    Patient: Jayashree Martínez  MRN: [id number]  YOB: 1955  Date of evaluation: 12/4/2020  Time:  1:37 PM     Procedure Summary     Date:  12/04/20 Room / Location:  [address] [place] [address]    Anesthesia Start:  0915 Anesthesia Stop:  0875    Procedure:  RIGHT UPPER BACK MELANOMA WIDE EXCISION AND RIGHT AXILLARY SENTINEL LYMPH NODE EXCISION (N/A ) Diagnosis:       Melanoma of back ([address]      (Malignant melanoma of torso excluding breast, right upper back)    Surgeon:  Zafar Felix MD Responsible Provider:  Hector Espinoza MD    Anesthesia Type:  general ASA Status:  2          Anesthesia Type: general    Qasim Phase I: Qasim Score: 10    Qasim Phase II: Qasim Score: 10    Last vitals: Reviewed and per EMR flowsheets.        Anesthesia Post Evaluation    Patient participation: complete - patient participated  Level of consciousness: awake  Airway patency: patent  Nausea & Vomiting: no nausea and no vomiting  Complications: no  Cardiovascular status: hemodynamically stable  Respiratory status: acceptable  Hydration status: stable

## 2020-12-04 NOTE — PROGRESS NOTES
Ambulatory Surgery/Procedure Discharge Note    Vitals:    12/04/20 1234   BP: 129/73   Pulse: 57   Resp: 18   Temp: 96.8 °F (36 °C)   SpO2: 100%     Patient meets discharge criteria based on Qasim score. In: 1120 [P.O.:120; I.V.:1000]  Out: -     Restroom use offered before discharge. Yes    Pain assessment:  present -    location, declines pain medication at this time. Pain is tolerable, does not like to take narcotics. Pain Level: 4    Discharge instructions reviewed with  Basilio White, 1 prescription for Oxycodone given in patient folder. Patient tolerating fluids prior to leaving. Dry dressing with tegaderm clean, dry and intact, surgical glue site axilla clean, dry and intact. All needs met at this time. Patient discharged to home/self care.  Patient discharged via wheel chair by transporter to waiting family/S.O.       12/4/2020 1:01 PM

## 2020-12-04 NOTE — OP NOTE
Operative Note      Patient: Kym Mejia  YOB: 1955  MRN: [id number]    Date of Procedure: 12/4/2020    Pre-Op Diagnosis: Malignant melanoma of torso excluding breast, right upper back    Post-Op Diagnosis: Same       Procedure(s):  RIGHT UPPER BACK MELANOMA WIDE EXCISION AND RIGHT AXILLARY SENTINEL LYMPH NODE EXCISION    Surgeon(s):  Juan Guaman MD    Assistant:   Resident: Viola Rey MD    Anesthesia: General    Estimated Blood Loss (mL): 10 ml    Complications: None    Specimens:   ID Type Source Tests Collected by Time Destination   A : RIGHT UPPER BACK MELANOMA Tissue Tissue SURGICAL PATHOLOGY Juan Guaman MD 12/4/2020 1000    B : RIGHT AXILLARY SENTINEL LYMPH NODE #2 Tissue Tissue SURGICAL PATHOLOGY Juan Guaman MD 12/4/2020 1047    C : RIGHT AXILLARY SENTINEL LYMPH NODE #1 Tissue Tissue SURGICAL PATHOLOGY Juan Guaman MD 12/4/2020 1038      INDICATIONS FOR OPERATION:  Discussed with patient about wide excision of the melanoma and sentinel lymph node biopsy. The risks of surgery include infection, bleeding, recurrence of the lesion and seroma formation post-operatively. Patient verbalized understanding of the risks and benefits and wishes to proceed. DETAILS OF PROCEDURE: Patient was identified in the preoperative area and brought to the operating room. General anesthesia given. Operative site is prepped and draped in a sterile manner. Timeout procedure was performed confirming the procedure, site and administration of antibiotics. Patient positioned with right side up. Lesion and margin marked for the excision. The lesion was measuring 2.2 cm x 1 cm. Area was stretched by injecting local. A margin of 2 cm was taken in most of the areas except small area on medial and lateral side where I got 1.5 cm (this was discussed with pt) and it was converted into an eyeball shape to allow for the closure. Local anesthesia infiltrated.  Skin incised with scalpel. Incision deepened to subcutaneous tissues. Electrocautery used for further division of tissues. Skin along with subcutaneous fat until fascia excised and oriented with sutures. Specimen sent to pathology. Hemostasis checked and achieved. Because the defect size, flaps are raised on all sides with diathermy. Flaps are raised above the fascia. The undermining of wound was more than the width of the wound. The wound was closed in layers with interrupted 3-0 Vicryl subcutaneous and 3-0 Vicryl deep dermal sutures. Skin was closed with 4-0 monocryl subcuticular suture. Length of complex closure was 11 cm. Derma flex with preneo placed over the closed incision. Telfa, gauze, tegaderm clear dressing applied. Patient repositioned supine. Right axilla prepped and draped. Patient had lymphoscintigram done before surgery. [place] lymph node is mapped to right axilla nodes as expected. Skin incision given over the sentinel node site. Subcutaneous tissues divided. Using gamma probe, hot node identified. Dissection done all around the node. Tissues divided with diathermy. Second hot node also excised. There were no other hot nodes. Hemostasis checked and achieved. Wound was closed with subcutaneous interrupted vicryl sutures and continuous Monocryl subcuticular suture. Derma flex placed. The patient tolerated the procedure well. Patient was awakened and transferred to the recovery room uneventfully. I was present for the entire procedure.     Florentino Church MD  Surgical Oncologist    Electronically signed by Thao Wyatt MD on 12/4/2020 at 12:07 PM

## 2020-12-10 ENCOUNTER — TELEPHONE (OUTPATIENT)
Dept: SURGERY | Age: 65
End: 2020-12-10

## 2020-12-14 NOTE — PROGRESS NOTES
Fabio Pittman is here for postop evaluation. She is gradually improving since then. Minimal pain now. No other complaints. O/E: Alert, oriented x 3, sitting comfortably with out any discomfort  No respiratory distress    Postop wound - healing well. Path - FINAL DIAGNOSIS:        A. Wide excision, right upper back melanoma:        - Small focus (less than 1mm) of residual malignant melanoma          identified within the dermis of the resection specimen.        - Biopsy cavity changes with mixed acute and chronic inflammation,          histiocytic response and overlying ulceration.        - CD68 and SOX-10 stains were utilized to evaluate the ulceration          bed material and the SOX-10 is positive in the residual malignant          melanoma with CD68 positive in background histiocytic cells.        - Resection margins are negative for malignancy.      - See case comment and synoptic report. B. [place] lymph node, right axilla #2:        - One lymph node with no evidence of metastatic malignant melanoma          on routine H&E histology, HMB-45 stain and MART-1 stain ( 0/1). C. [place] lymph node, right axilla #1:        - One lymph node with no evidence of metastatic malignant melanoma     A/P: S/P Right Upper Back Melanoma Wide Excision And Right Axillary [place] Lymph Node Excision 12/4/20, doing well  No postop complications  Path as above reviewed with the patient  Follow up with Dr. Maddie Darnell  Follow up in 3 months    Harshal Hinojosa MD  Surgery Attending    I, Janee Cabrera RN, am scribing for and in the presence of Dr Harshal Hinojosa. Janee Cabrera RN  I, Dr. Harshal Hinojosa, personally performed the services described in this documentation as scribed by Janee Cabrera RN in my presence, and it is both accurate and complete.      Harshal Hinojosa MD  Surgery Attending

## 2020-12-18 ENCOUNTER — OFFICE VISIT (OUTPATIENT)
Dept: SURGERY | Age: 65
End: 2020-12-18

## 2020-12-18 VITALS
RESPIRATION RATE: 16 BRPM | SYSTOLIC BLOOD PRESSURE: 135 MMHG | DIASTOLIC BLOOD PRESSURE: 75 MMHG | HEIGHT: 65 IN | OXYGEN SATURATION: 100 % | TEMPERATURE: 97.2 F | HEART RATE: 69 BPM | WEIGHT: 117.8 LBS | BODY MASS INDEX: 19.63 KG/M2

## 2020-12-18 DIAGNOSIS — C43.59 MALIGNANT MELANOMA OF TORSO EXCLUDING BREAST (HCC): ICD-10-CM

## 2020-12-18 DIAGNOSIS — Z09 POSTOP CHECK: Primary | ICD-10-CM

## 2020-12-18 PROCEDURE — 99024 POSTOP FOLLOW-UP VISIT: CPT | Performed by: SURGERY

## 2020-12-18 RX ORDER — VITAMIN B COMPLEX
1 TABLET ORAL DAILY
COMMUNITY

## 2020-12-18 RX ORDER — VIT C/B6/B5/MAGNESIUM/HERB 173 50-5-6-5MG
3 CAPSULE ORAL DAILY
COMMUNITY

## 2020-12-18 RX ORDER — DICLOFENAC SODIUM 30 MG/G
GEL TOPICAL DAILY
COMMUNITY
Start: 2020-08-06

## 2021-03-22 NOTE — PROGRESS NOTES
Melanoma Follow-up  Urbano Hernandez MD  Surgical Oncology  [phone]    Date of service: 3/23/2021     Emily Gooden    Initial Melanoma:   Location: Right upper back    Date initially Treated  12/4/20     Thickness at least 1.8mm. Level: at least Level IV   Ulceration  No                                Mitotic Rate none identified  Regression  No  Stage pT2b  Lymph node negative    Subsequent Melanoma: no    Emily Gooden returns for follow up of her melanoma of the Right upper back. She is basically doing well. She denies new subcutaneous mass, headache, bone pain, cough, abdominal pain, or suspicious new lesions. She is being followed by Dr. Terri Yanez. Endorses right arm numbness in the area of her axilla. No other complaints. Review of systems is otherwise negative    Past medical history, Past surgical history, Social history, Family history and allergies reviewed and updated. Vitals:    03/23/21 1027   BP: 136/74   Site: Left Upper Arm   Position: Sitting   Cuff Size: Medium Adult   Pulse: 66   Resp: 16   Temp: 97.3 °F (36.3 °C)   TempSrc: Temporal   SpO2: 100%   Weight: 117 lb (53.1 kg)   Height: 5' 5\" (1.651 m)     Wt Readings from Last 3 Encounters:   03/23/21 117 lb (53.1 kg)   12/18/20 117 lb 12.8 oz (53.4 kg)   12/04/20 117 lb (53.1 kg)     Body mass index is 19.47 kg/m². O/E:   Constitutional: Patient is oriented to person, place, and time. No distress. HENT: mucus membranes - moist. No scleral icterus. Neck: Supple and normal range of motion. No bilateral lymphadenopathy present. Pulmonary/Chest: Effort normal. No respiratory distress. Abdominal: Soft. No distension and no mass. No tenderness. No Hepatosplenomegaly. Extremities: no edema and no tenderness. Neurological: Grossly intact motor and sensory exam  Skin: Skin is warm and dry. No rash noted. She is not diaphoretic.   Surgical site:    Incision normal: Yes   Surrounding nodularity: No   Abnormal pigmentation: No   Other

## 2021-03-23 ENCOUNTER — OFFICE VISIT (OUTPATIENT)
Dept: SURGERY | Age: 66
End: 2021-03-23
Payer: MEDICARE

## 2021-03-23 VITALS
OXYGEN SATURATION: 100 % | SYSTOLIC BLOOD PRESSURE: 136 MMHG | DIASTOLIC BLOOD PRESSURE: 74 MMHG | WEIGHT: 117 LBS | HEIGHT: 65 IN | HEART RATE: 66 BPM | TEMPERATURE: 97.3 F | RESPIRATION RATE: 16 BRPM | BODY MASS INDEX: 19.49 KG/M2

## 2021-03-23 DIAGNOSIS — C43.59 MALIGNANT MELANOMA OF TORSO EXCLUDING BREAST (HCC): Primary | ICD-10-CM

## 2021-03-23 PROCEDURE — 1090F PRES/ABSN URINE INCON ASSESS: CPT | Performed by: SURGERY

## 2021-03-23 PROCEDURE — 1123F ACP DISCUSS/DSCN MKR DOCD: CPT | Performed by: SURGERY

## 2021-03-23 PROCEDURE — G8427 DOCREV CUR MEDS BY ELIG CLIN: HCPCS | Performed by: SURGERY

## 2021-03-23 PROCEDURE — 99213 OFFICE O/P EST LOW 20 MIN: CPT | Performed by: SURGERY

## 2021-03-23 PROCEDURE — 4040F PNEUMOC VAC/ADMIN/RCVD: CPT | Performed by: SURGERY

## 2021-03-23 PROCEDURE — G8420 CALC BMI NORM PARAMETERS: HCPCS | Performed by: SURGERY

## 2021-03-23 PROCEDURE — 3017F COLORECTAL CA SCREEN DOC REV: CPT | Performed by: SURGERY

## 2021-03-23 PROCEDURE — 1036F TOBACCO NON-USER: CPT | Performed by: SURGERY

## 2021-03-23 PROCEDURE — G8400 PT W/DXA NO RESULTS DOC: HCPCS | Performed by: SURGERY

## 2021-03-23 PROCEDURE — G8484 FLU IMMUNIZE NO ADMIN: HCPCS | Performed by: SURGERY

## 2021-03-23 RX ORDER — FOLIC ACID 1 MG/1
1 TABLET ORAL DAILY
COMMUNITY
End: 2021-07-23 | Stop reason: ALTCHOICE

## 2021-06-24 NOTE — PROGRESS NOTES
Melanoma Follow-up  Liliana Greco MD  Surgical Oncology  [phone]    Date of service: 6/25/2021     Sai Low    Initial Melanoma:   Location: Right upper back    Date initially Treated  12/4/20     Thickness at least 1.8mm. Level: at least Level IV   Ulceration  No                                Mitotic Rate none identified  Regression  No  Stage pT2b  (Lymph node negative)    Subsequent Melanoma: no    Sai Low returns for follow up of her melanoma of the Right upper back. S/P Right Upper Back Melanoma Wide Excision And Right Axillary [place] Lymph Node Excision 12/4/20. She continues doing well. She denies new subcutaneous mass, headache, bone pain, cough, abdominal pain, or suspicious new lesions. She is being followed by Dr. Carson Watkins. Patient endorsed right arm numbness in the area of her axilla at last visit in March 2021. States numbness is a litte better. Endorses a pinching/stabbing feeling to the left of her incisional site. It is occasional not constant. Intermittent shortness of breath. No other complaints. Review of systems is otherwise negative    Past medical history, Past surgical history, Social history, Family history and allergies reviewed and updated. Vitals:    06/25/21 0934   BP: 137/76   Site: Right Upper Arm   Position: Sitting   Cuff Size: Medium Adult   Pulse: 70   Resp: 16   SpO2: 100%   Weight: 116 lb (52.6 kg)   Height: 5' 5\" (1.651 m)     Wt Readings from Last 3 Encounters:   06/25/21 116 lb (52.6 kg)   03/23/21 117 lb (53.1 kg)   12/18/20 117 lb 12.8 oz (53.4 kg)     Body mass index is 19.3 kg/m². O/E:   Constitutional: Patient is oriented to person, place, and time. No distress. HENT: mucus membranes - moist. No scleral icterus. Neck: Supple and normal range of motion. No bilateral lymphadenopathy present. Pulmonary/Chest: Effort normal. No respiratory distress. Abdominal: Soft. No distension and no mass. No tenderness. No Hepatosplenomegaly.

## 2021-06-25 ENCOUNTER — OFFICE VISIT (OUTPATIENT)
Dept: SURGERY | Age: 66
End: 2021-06-25
Payer: MEDICARE

## 2021-06-25 VITALS
OXYGEN SATURATION: 100 % | HEART RATE: 70 BPM | HEIGHT: 65 IN | RESPIRATION RATE: 16 BRPM | DIASTOLIC BLOOD PRESSURE: 76 MMHG | SYSTOLIC BLOOD PRESSURE: 137 MMHG | BODY MASS INDEX: 19.33 KG/M2 | WEIGHT: 116 LBS

## 2021-06-25 DIAGNOSIS — C43.59 MALIGNANT MELANOMA OF TORSO EXCLUDING BREAST (HCC): Primary | ICD-10-CM

## 2021-06-25 PROCEDURE — 99213 OFFICE O/P EST LOW 20 MIN: CPT | Performed by: SURGERY

## 2021-06-25 PROCEDURE — 1090F PRES/ABSN URINE INCON ASSESS: CPT | Performed by: SURGERY

## 2021-06-25 PROCEDURE — G8420 CALC BMI NORM PARAMETERS: HCPCS | Performed by: SURGERY

## 2021-06-25 PROCEDURE — 3017F COLORECTAL CA SCREEN DOC REV: CPT | Performed by: SURGERY

## 2021-06-25 PROCEDURE — 1036F TOBACCO NON-USER: CPT | Performed by: SURGERY

## 2021-06-25 PROCEDURE — G8400 PT W/DXA NO RESULTS DOC: HCPCS | Performed by: SURGERY

## 2021-06-25 PROCEDURE — 4040F PNEUMOC VAC/ADMIN/RCVD: CPT | Performed by: SURGERY

## 2021-06-25 PROCEDURE — 1123F ACP DISCUSS/DSCN MKR DOCD: CPT | Performed by: SURGERY

## 2021-06-25 PROCEDURE — G8427 DOCREV CUR MEDS BY ELIG CLIN: HCPCS | Performed by: SURGERY

## 2021-07-13 DIAGNOSIS — C43.59 MALIGNANT MELANOMA OF TORSO EXCLUDING BREAST (HCC): ICD-10-CM

## 2021-07-13 DIAGNOSIS — R05.9 COUGH: Primary | ICD-10-CM

## 2021-07-14 ENCOUNTER — TELEPHONE (OUTPATIENT)
Dept: SURGERY | Age: 66
End: 2021-07-14

## 2021-07-15 ENCOUNTER — TELEPHONE (OUTPATIENT)
Dept: SURGERY | Age: 66
End: 2021-07-15

## 2021-07-15 DIAGNOSIS — C43.59 MALIGNANT MELANOMA OF TORSO EXCLUDING BREAST (HCC): Primary | ICD-10-CM

## 2021-07-15 NOTE — TELEPHONE ENCOUNTER
Patient called in stating she has a small, painful growth located to the left of her incision directly above her shoulder blade. It is raised, solid and very sore to the touch (sharp, stabbing pain) - and is waking her up during the night whenever she rolls on her back. She would like to know if  a follow up visit with Dr Rosalie John or Dr. Mary Kate Skelton is warranted.     Please call patient: [phone]

## 2021-07-15 NOTE — TELEPHONE ENCOUNTER
Patient states being grateful she has a date for imaging studies. She will schedule with Dr. Barbara Luong after imaging.

## 2021-07-22 ENCOUNTER — HOSPITAL ENCOUNTER (OUTPATIENT)
Dept: CT IMAGING | Age: 66
Discharge: HOME | End: 2021-07-22
Payer: MEDICARE

## 2021-07-22 DIAGNOSIS — R05.9 COUGH: ICD-10-CM

## 2021-07-22 DIAGNOSIS — C43.59 MALIGNANT MELANOMA OF TORSO EXCLUDING BREAST (HCC): ICD-10-CM

## 2021-07-22 LAB
GFR AFRICAN AMERICAN: >60
GFR NON-AFRICAN AMERICAN: >60
PERFORMED ON: NORMAL
POC CREATININE: 0.9 MG/DL (ref 0.6–1.2)
POC SAMPLE TYPE: NORMAL

## 2021-07-22 PROCEDURE — 71260 CT THORAX DX C+: CPT

## 2021-07-22 PROCEDURE — 6360000004 HC RX CONTRAST MEDICATION: Performed by: SURGERY

## 2021-07-22 PROCEDURE — 82565 ASSAY OF CREATININE: CPT

## 2021-07-22 RX ADMIN — IOPAMIDOL 80 ML: 755 INJECTION, SOLUTION INTRAVENOUS at 11:13

## 2021-07-22 NOTE — PROGRESS NOTES
Melanoma Follow-up  Rosetta Morrison MD  Surgical Oncology  [phone]    Date of service: 7/23/2021     Phil Bernabe    Initial Melanoma:   Location: Right upper back    Date initially Treated  12/4/20     Thickness at least 1.8mm. Level: at least Level IV   Ulceration  No                                Mitotic Rate none identified  Regression  No  Stage pT2b  (Lymph node negative)    Subsequent Melanoma: no    Phil Bernabe returns for follow up of her melanoma of the Right upper back. S/P Right Upper Back Melanoma Wide Excision And Right Axillary [place] Lymph Node Excision 12/4/20. She continues doing well. She denies new subcutaneous mass, headache, bone pain, cough, abdominal pain, or suspicious new lesions. She is being followed by Dr. Anthony Baptiste. Patient endorsed right arm numbness in the area of her axilla at last visit in March 2021. States numbness is a litte better. Endorses a pinching/stabbing feeling to the left of her incisional site. It is occasional not constant. Patient stated at last visit that she was having intermittent shortness of breath. Currently patient endorses an increase in S. O.B along with chest tightness. No other complaints. Review of systems is otherwise negative    Past medical history, Past surgical history, Social history, Family history and allergies reviewed and updated. Vitals:    07/23/21 1312   BP: 133/67   Pulse: 70   Temp: 97.4 °F (36.3 °C)   Weight: 117 lb 6.4 oz (53.3 kg)   Height: 5' 5\" (1.651 m)     Wt Readings from Last 3 Encounters:   07/23/21 117 lb 6.4 oz (53.3 kg)   06/25/21 116 lb (52.6 kg)   03/23/21 117 lb (53.1 kg)     Body mass index is 19.54 kg/m². O/E:   Constitutional: Patient is oriented to person, place, and time. No distress. HENT: mucus membranes - moist. No scleral icterus. Neck: Supple and normal range of motion. No bilateral lymphadenopathy present. Pulmonary/Chest: Effort normal. No respiratory distress. Abdominal: Soft.  No distension and no mass. No tenderness. No Hepatosplenomegaly. Extremities: no edema and no tenderness. Neurological: Grossly intact motor and sensory exam  Skin: Skin is warm and dry. No rash noted. She is not diaphoretic. Surgical site:    Incision normal: Yes   Surrounding nodularity: No   Abnormal pigmentation: No   Other lesions: No  Lymph nodes: Palpable regional lymph nodes: No  Psychiatric: She has a normal mood and affect. Her behavior is normal. Judgment and thought content normal.    CT Chest 7/22/21:     1. 7 mm left upper lobe nodule. Follow-up is recommended according to oncology protocol. A/P:    Diagnosis Orders   1. Malignant melanoma of torso excluding breast (HCC)         No evidence of recurrent melanoma  No evidence of new primary melanoma  Shortness of breath intermittent - follow with PCP and pulmonologist referral given  Imaging studies reviewed with the patient    Plan: The patient has (Stage pT2b) melanoma. The chance of developing a new melanoma is present and thus importance of follow up with dermatologist is explained. Symptoms and signs of recurrence reviewed including headache, jaundice, cough, abdominal pain and blood in stools. Imaging studies reviewed with the patient  Follow up with Dr. Shay Lyon. Follow up in 6 months. Bea Andujar MD  Surgery Attending    I, Shayne Wolff RN, am scribing for and in the presence of Dr Bea Andujar. Shayne Wolff RN    I, Dr. Bea nAdujar, personally performed the services described in this documentation as scribed by Shayne Wolff RN in my presence, and it is both accurate and complete.      Bea Andujar MD  Surgery Attending

## 2021-07-23 ENCOUNTER — OFFICE VISIT (OUTPATIENT)
Dept: SURGERY | Age: 66
End: 2021-07-23
Payer: MEDICARE

## 2021-07-23 VITALS
TEMPERATURE: 97.4 F | DIASTOLIC BLOOD PRESSURE: 67 MMHG | BODY MASS INDEX: 19.56 KG/M2 | HEIGHT: 65 IN | HEART RATE: 70 BPM | WEIGHT: 117.4 LBS | SYSTOLIC BLOOD PRESSURE: 133 MMHG

## 2021-07-23 DIAGNOSIS — C43.59 MALIGNANT MELANOMA OF TORSO EXCLUDING BREAST (HCC): Primary | ICD-10-CM

## 2021-07-23 DIAGNOSIS — R91.1 LUNG NODULE: ICD-10-CM

## 2021-07-23 PROCEDURE — G8420 CALC BMI NORM PARAMETERS: HCPCS | Performed by: SURGERY

## 2021-07-23 PROCEDURE — 99213 OFFICE O/P EST LOW 20 MIN: CPT | Performed by: SURGERY

## 2021-07-23 PROCEDURE — G8400 PT W/DXA NO RESULTS DOC: HCPCS | Performed by: SURGERY

## 2021-07-23 PROCEDURE — 3017F COLORECTAL CA SCREEN DOC REV: CPT | Performed by: SURGERY

## 2021-07-23 PROCEDURE — 1123F ACP DISCUSS/DSCN MKR DOCD: CPT | Performed by: SURGERY

## 2021-07-23 PROCEDURE — G8427 DOCREV CUR MEDS BY ELIG CLIN: HCPCS | Performed by: SURGERY

## 2021-07-23 PROCEDURE — 1090F PRES/ABSN URINE INCON ASSESS: CPT | Performed by: SURGERY

## 2021-07-23 PROCEDURE — 4040F PNEUMOC VAC/ADMIN/RCVD: CPT | Performed by: SURGERY

## 2021-07-23 PROCEDURE — 1036F TOBACCO NON-USER: CPT | Performed by: SURGERY

## 2021-11-05 ENCOUNTER — OFFICE VISIT (OUTPATIENT)
Dept: SURGERY | Age: 66
End: 2021-11-05
Payer: MEDICARE

## 2021-11-05 VITALS
BODY MASS INDEX: 21.26 KG/M2 | HEIGHT: 65 IN | DIASTOLIC BLOOD PRESSURE: 74 MMHG | WEIGHT: 127.6 LBS | TEMPERATURE: 97.2 F | SYSTOLIC BLOOD PRESSURE: 122 MMHG | HEART RATE: 71 BPM

## 2021-11-05 DIAGNOSIS — R91.1 LUNG NODULE: ICD-10-CM

## 2021-11-05 DIAGNOSIS — C43.59 MALIGNANT MELANOMA OF TORSO EXCLUDING BREAST (HCC): Primary | ICD-10-CM

## 2021-11-05 PROCEDURE — 3017F COLORECTAL CA SCREEN DOC REV: CPT | Performed by: SURGERY

## 2021-11-05 PROCEDURE — G8484 FLU IMMUNIZE NO ADMIN: HCPCS | Performed by: SURGERY

## 2021-11-05 PROCEDURE — G8420 CALC BMI NORM PARAMETERS: HCPCS | Performed by: SURGERY

## 2021-11-05 PROCEDURE — 99213 OFFICE O/P EST LOW 20 MIN: CPT | Performed by: SURGERY

## 2021-11-05 PROCEDURE — 1090F PRES/ABSN URINE INCON ASSESS: CPT | Performed by: SURGERY

## 2021-11-05 PROCEDURE — G8400 PT W/DXA NO RESULTS DOC: HCPCS | Performed by: SURGERY

## 2021-11-05 PROCEDURE — 1123F ACP DISCUSS/DSCN MKR DOCD: CPT | Performed by: SURGERY

## 2021-11-05 PROCEDURE — 1036F TOBACCO NON-USER: CPT | Performed by: SURGERY

## 2021-11-05 PROCEDURE — G8427 DOCREV CUR MEDS BY ELIG CLIN: HCPCS | Performed by: SURGERY

## 2021-11-05 PROCEDURE — 4040F PNEUMOC VAC/ADMIN/RCVD: CPT | Performed by: SURGERY

## 2021-11-05 NOTE — PROGRESS NOTES
Melanoma Follow-up  Irina Zuniga MD  Surgical Oncology  [phone]    Date of service: 11/5/2021     [facility]    Initial Melanoma:   Location: Right upper back    Date initially Treated  12/4/20     Thickness at least 1.8mm. Level: at least Level IV   Ulceration  No                                Mitotic Rate none identified  Regression  No  Stage pT2b  (Lymph node negative)    Subsequent Melanoma: no    [employer] returns for follow up of her melanoma of the Right upper back. S/P Right Upper Back Melanoma Wide Excision And Right Axillary [place] Lymph Node Excision 12/4/20. Followed with pulmonologist for lung nodule after last visit - plan is for repeat CT. She is otherwise continues to do well. She denies new subcutaneous mass, headache, bone pain, cough, abdominal pain, or suspicious new lesions. She is being followed by Dr. Michelle Ortiz. No other complaints. Review of systems is otherwise negative    Past medical history, Past surgical history, Social history, Family history and allergies reviewed and updated. Vitals:    11/05/21 1031   BP: 122/74   Pulse: 71   Temp: 97.2 °F (36.2 °C)   Weight: 127 lb 9.6 oz (57.9 kg)   Height: 5' 5\" (1.651 m)     Wt Readings from Last 3 Encounters:   11/05/21 127 lb 9.6 oz (57.9 kg)   07/23/21 117 lb 6.4 oz (53.3 kg)   06/25/21 116 lb (52.6 kg)     Body mass index is 21.23 kg/m². O/E:   Constitutional: Patient is oriented to person, place, and time. No distress. HENT: mucus membranes - moist. No scleral icterus. Neck: Supple and normal range of motion. No bilateral lymphadenopathy present. Pulmonary/Chest: Effort normal. No respiratory distress. Abdominal: Soft. No distension and no mass. No tenderness. No Hepatosplenomegaly. Extremities: no edema and no tenderness. Neurological: Grossly intact motor and sensory exam  Skin: Skin is warm and dry. No rash noted. She is not diaphoretic.   Surgical site:    Incision normal: Yes   Surrounding nodularity: No   Abnormal pigmentation: No   Other lesions: No  Lymph nodes: Palpable regional lymph nodes: No  Psychiatric: She has a normal mood and affect. Her behavior is normal. Judgment and thought content normal.    A/P:    Diagnosis Orders   1. Malignant melanoma of torso excluding breast (HCC)     2. Lung nodule       No evidence of recurrent melanoma  No evidence of new primary melanoma  Lung nodule - follow CT    Plan: The patient has (Stage pT2b) melanoma. The chance of developing a new melanoma is present and thus importance of follow up with dermatologist is explained. Symptoms and signs of recurrence reviewed including headache, jaundice, cough, abdominal pain and blood in stools. Follow up with Dr. Krystal Sutton. Follow up in 3 months. Vanessa Norwood MD  Surgery Attending    I, Florida Sandoval RN, am scribing for and in the presence of Dr Vanessa Norwood. Florida Sandoval RN    I, Dr. Vanessa Norwood, personally performed the services described in this documentation as scribed by Florida Sandoval RN in my presence, and it is both accurate and complete.      Vanessa Norwood MD  Surgery Attending

## 2021-11-09 PROBLEM — R26.9 GAIT ABNORMALITY: Status: ACTIVE | Noted: 2021-03-24

## 2021-11-09 PROBLEM — M53.3 SI (SACROILIAC) JOINT DYSFUNCTION: Status: ACTIVE | Noted: 2021-03-24

## 2021-11-09 PROBLEM — M16.12 LOCALIZED OSTEOARTHROSIS OF LEFT HIP: Status: ACTIVE | Noted: 2019-04-16

## 2021-11-09 PROBLEM — M16.0 BILATERAL HIP JOINT ARTHRITIS: Status: ACTIVE | Noted: 2021-03-24

## 2021-11-09 PROBLEM — R63.6 UNDERWEIGHT: Status: ACTIVE | Noted: 2017-01-20

## 2021-11-09 PROBLEM — M76.32 IT BAND SYNDROME, LEFT: Status: ACTIVE | Noted: 2019-04-16

## 2021-11-09 PROBLEM — C43.9 MALIGNANT MELANOMA (HCC): Status: ACTIVE | Noted: 2020-11-03

## 2021-11-23 ENCOUNTER — TELEPHONE (OUTPATIENT)
Dept: SURGERY | Age: 66
End: 2021-11-23

## 2021-11-23 NOTE — TELEPHONE ENCOUNTER
Called patient to follow up on labs that where supposed to be done and she said she was going to go to the lab next week and have them drawn which would be the last week of Nov or the 1st week of Dec

## 2021-12-06 DIAGNOSIS — C43.59 MALIGNANT MELANOMA OF TORSO EXCLUDING BREAST (HCC): ICD-10-CM

## 2021-12-06 LAB
BUN BLDV-MCNC: 14 MG/DL (ref 7–20)
CREAT SERPL-MCNC: 0.8 MG/DL (ref 0.6–1.2)
GFR AFRICAN AMERICAN: >60
GFR NON-AFRICAN AMERICAN: >60

## 2022-02-07 NOTE — PROGRESS NOTES
Melanoma Follow-up  Dawn Fontana MD  Surgical Oncology  [phone]    Date of service: 2/8/2022     Bernarda Murray    Initial Melanoma:   Location: Right upper back    Date initially Treated  12/4/20     Thickness at least 1.8mm. Level: at least Level IV   Ulceration  No                                Mitotic Rate none identified  Regression  No  Stage pT2b  (Lymph node negative)  [employer]: Class 1A    Subsequent Melanoma: no    Bernarda Murray returns for follow up of her melanoma of the Right upper back. S/P Right Upper Back Melanoma Wide Excision And Right Axillary [place] Lymph Node Excision 12/4/20. Patient is followed by  at [facility] for her lung nodule. Patient has a strong family history of lung cancer but all in smokers. She is otherwise continues to do well. She denies new subcutaneous mass, headache, bone pain, jaundice, cough, abdominal pain, or suspicious new lesions. Patient denies any change in bowel pattern or blood in stool. She is being followed by Dr. Joellen Sebastian. No other complaints. Review of systems is otherwise negative    Past medical history, Past surgical history, Social history, Family history and allergies reviewed and updated. Vitals:    02/08/22 1007   BP: 128/72   Pulse: 67   Temp: 97.5 °F (36.4 °C)   Weight: 119 lb (54 kg)   Height: 5' 5\" (1.651 m)     Wt Readings from Last 3 Encounters:   02/08/22 119 lb (54 kg)   11/05/21 127 lb 9.6 oz (57.9 kg)   07/23/21 117 lb 6.4 oz (53.3 kg)     Body mass index is 19.8 kg/m². O/E:   Constitutional: Patient is oriented to person, place, and time. No distress. HENT: mucus membranes - moist. No scleral icterus. Neck: Supple and normal range of motion. No bilateral lymphadenopathy present. Pulmonary/Chest: Effort normal. No respiratory distress. Abdominal: Soft. No distension and no mass. No tenderness. No Hepatosplenomegaly. Extremities: no edema and no tenderness.  New Lesion to Right Shoulder - going to follow with Dr. Rafal Farias. Neurological: Grossly intact motor and sensory exam  Skin: Skin is warm and dry. No rash noted. She is not diaphoretic. Surgical site:    Incision normal: Yes   Surrounding nodularity: No   Abnormal pigmentation: No   Other lesions: No  Lymph nodes: Palpable regional lymph nodes: No  Psychiatric: She has a normal mood and affect. Her behavior is normal. Judgment and thought content normal.    CT Chest WO 1/25/22:      Decreased size of left upper lobe inflammatory nodule since comparison, now sub-5 mm and benign. No significant new abnormality. A/P:    Diagnosis Orders   1. Malignant melanoma of torso excluding breast (HCC)     2. Lung nodule       No evidence of recurrent melanoma  No evidence of new primary melanoma  Lung nodule - follow CT    Plan: The patient has (Stage pT2b) melanoma. The chance of developing a new melanoma is present and thus importance of follow up with dermatologist is explained. Symptoms and signs of recurrence reviewed including headache, jaundice, cough, abdominal pain and blood in stools. Imaging studies reviewed with the patient. Follow up with   Follow up with Dr. Rafal Farias. Follow up in 3 months. Cordell Amaro MD  Surgery Attending    I, Adán Hoover RN, am scribing for and in the presence of Dr Cordell Amaro. Adán Hoover RN    I, Dr. Cordell Amaro, personally performed the services described in this documentation as scribed by Adán Hoover RN in my presence, and it is both accurate and complete.      Cordell Amaro MD  Surgery Attending

## 2022-02-08 ENCOUNTER — OFFICE VISIT (OUTPATIENT)
Dept: SURGERY | Age: 67
End: 2022-02-08
Payer: MEDICARE

## 2022-02-08 VITALS
WEIGHT: 119 LBS | SYSTOLIC BLOOD PRESSURE: 128 MMHG | DIASTOLIC BLOOD PRESSURE: 72 MMHG | BODY MASS INDEX: 19.83 KG/M2 | HEIGHT: 65 IN | HEART RATE: 67 BPM | TEMPERATURE: 97.5 F

## 2022-02-08 DIAGNOSIS — C43.59 MALIGNANT MELANOMA OF TORSO EXCLUDING BREAST (HCC): Primary | ICD-10-CM

## 2022-02-08 DIAGNOSIS — R91.1 LUNG NODULE: ICD-10-CM

## 2022-02-08 PROCEDURE — 99213 OFFICE O/P EST LOW 20 MIN: CPT | Performed by: SURGERY

## 2022-07-18 RX ORDER — ASPIRIN 81 MG/1
81 TABLET ORAL EVERY OTHER DAY
COMMUNITY
Start: 2021-10-07

## 2022-07-18 RX ORDER — ASCORBIC ACID 250 MG
TABLET ORAL
COMMUNITY

## 2022-07-18 RX ORDER — ATORVASTATIN CALCIUM 20 MG/1
20 TABLET, FILM COATED ORAL DAILY
COMMUNITY
Start: 2021-03-23

## 2022-07-18 RX ORDER — METHYLPREDNISOLONE 4 MG/1
TABLET ORAL
COMMUNITY
Start: 2022-05-20

## 2022-07-18 RX ORDER — METHOCARBAMOL 500 MG/1
500 TABLET, FILM COATED ORAL NIGHTLY PRN
COMMUNITY
Start: 2021-03-24

## 2022-08-08 NOTE — PROGRESS NOTES
[facility] SURGICAL ONCOLOGY  [address]  Dept: [phone]  Dept Fax: [phone]      Visit Date: 8/9/2022    Initial Melanoma:   Location: Right upper back  Date initially Treated  12/4/20     Thickness at least 1.8mm. Level: at least Level IV   Ulceration  No                                Mitotic Rate none identified  Regression  No  Stage pT2b  (Lymph node negative)  [employer]: Class 1A    Subsequent Melanoma: no    Subjective: Dc Hart is a 75 y.o. female here for follow-up of right upper back melanoma. S/p right upper back melanoma wide excision and right axillary sentinel lymph node excision 12/4/2020. Overall she is doing well. She denies new subcutaneous mass, headache, bone pain, jaundice, cough, abdominal pain, or suspicious new lesions. Patient denies any change in bowel pattern or blood in stool. She is being followed by Dr. Varun Johnson. No other complaints. Review of systems is otherwise negative. Objective:     /74 (Site: Right Upper Arm)   Pulse 76   Temp 97 °F (36.1 °C) (Temporal)   Ht 5' 5\" (1.651 m)   Wt 113 lb 12.8 oz (51.6 kg)   BMI 18.94 kg/m²     O/E:   Constitutional: Patient is oriented to person, place, and time. No distress. HENT: mucus membranes - moist. No scleral icterus. Neck: Supple and normal range of motion. No bilateral lymphadenopathy present. Pulmonary/Chest: Effort normal. No respiratory distress. Abdominal: Soft. No distension and no mass. No tenderness. No Hepatosplenomegaly. Extremities: no edema and no tenderness. Neurological: Grossly intact motor and sensory exam  Skin: Skin is warm and dry. No rash noted. She is not diaphoretic. Surgical site:    Incision normal: Yes   Surrounding nodularity: No   Abnormal pigmentation: No   Other lesions: No  Lymph nodes: Palpable regional lymph nodes: No  Psychiatric: She has a normal mood and affect.  Her behavior is normal. Judgment and thought content normal.     Imaging:   CT Chest 7/22/21:   1. 7 mm left upper lobe nodule. Follow-up is recommended according to oncology protocol. CT Chest 1/25/22:   Decreased size of left upper lobe inflammatory nodule since comparison, nowsub-5 mm and benign. Assessment/Plan:      Diagnosis Orders   1. Malignant melanoma of torso excluding breast (HCC)        2. Lung nodule          No evidence of recurrent melanoma  No evidence of new primary melanoma  Lung nodule - has decreased in size. The patient has (Stage pT2b) melanoma. The chance of developing a new melanoma is present and thus importance of follow up with dermatologist is explained. Symptoms and signs of recurrence reviewed including headache, jaundice, cough, abdominal pain and blood in stools. Imaging studies reviewed with the patient. Follow up with   Follow up with Dr. Protillo Lassitre. Follow up in 3 months      Giovanni HENDRICKS DNP, TOD, am scribing for an in the presence of Dr. Shanthi Kaur. 8/9/2022, 9:55 AM    IDr. Shanthi, personally performed the services described in this documentation as scribed by Giovanni Bernabe DNP, TOD, in my presence, and it is both accurate and complete.      Shanthi Kaur MD  Surgery Attending

## 2022-08-09 ENCOUNTER — OFFICE VISIT (OUTPATIENT)
Dept: SURGERY | Age: 67
End: 2022-08-09
Payer: MEDICARE

## 2022-08-09 VITALS
TEMPERATURE: 97 F | WEIGHT: 113.8 LBS | BODY MASS INDEX: 18.96 KG/M2 | HEIGHT: 65 IN | HEART RATE: 76 BPM | DIASTOLIC BLOOD PRESSURE: 74 MMHG | SYSTOLIC BLOOD PRESSURE: 132 MMHG

## 2022-08-09 DIAGNOSIS — C43.59 MALIGNANT MELANOMA OF TORSO EXCLUDING BREAST (HCC): Primary | ICD-10-CM

## 2022-08-09 DIAGNOSIS — R91.1 LUNG NODULE: ICD-10-CM

## 2022-08-09 PROCEDURE — 99213 OFFICE O/P EST LOW 20 MIN: CPT | Performed by: SURGERY

## 2022-08-09 PROCEDURE — 1123F ACP DISCUSS/DSCN MKR DOCD: CPT | Performed by: SURGERY

## 2022-08-19 PROBLEM — I25.118 CORONARY ARTERY DISEASE OF NATIVE HEART WITH STABLE ANGINA PECTORIS (HCC): Status: ACTIVE | Noted: 2022-04-03

## 2022-08-19 RX ORDER — IMIQUIMOD 12.5 MG/.25G
CREAM TOPICAL
COMMUNITY
Start: 2022-01-20

## 2022-08-19 RX ORDER — MINOXIDIL 2.5 MG/1
TABLET ORAL
COMMUNITY
Start: 2022-01-20

## 2022-08-19 RX ORDER — CEPHALEXIN 500 MG/1
CAPSULE ORAL
COMMUNITY
Start: 2022-08-12

## 2022-08-19 RX ORDER — ESOMEPRAZOLE MAGNESIUM 40 MG/1
40 CAPSULE, DELAYED RELEASE ORAL
COMMUNITY
Start: 2022-05-17

## 2022-11-21 PROBLEM — G89.3 CHRONIC PAIN DUE TO MALIGNANT NEOPLASTIC DISEASE: Status: ACTIVE | Noted: 2022-11-14

## 2022-11-21 PROBLEM — C78.7 SECONDARY MALIGNANT NEOPLASM OF LIVER (HCC): Status: ACTIVE | Noted: 2022-09-16

## 2022-11-21 PROBLEM — R53.1 GENERALIZED WEAKNESS: Status: ACTIVE | Noted: 2022-10-20

## 2022-11-21 PROBLEM — F41.8 DEPRESSION WITH ANXIETY: Status: ACTIVE | Noted: 2022-11-14

## 2022-11-21 PROBLEM — C79.31: Status: ACTIVE | Noted: 2022-09-28

## 2022-11-21 PROBLEM — Z66 DNR (DO NOT RESUSCITATE): Status: ACTIVE | Noted: 2022-10-13

## 2022-11-21 PROBLEM — R64 CACHEXIA (HCC): Status: ACTIVE | Noted: 2022-11-14

## 2022-11-21 PROBLEM — C43.59 MALIGNANT MELANOMA OF TORSO EXCLUDING BREAST (HCC): Status: ACTIVE | Noted: 2020-11-03

## 2022-11-21 PROBLEM — R11.0 NAUSEA: Status: ACTIVE | Noted: 2022-11-14

## 2022-11-21 PROBLEM — I50.32 CHRONIC DIASTOLIC CHF (CONGESTIVE HEART FAILURE) (HCC): Status: ACTIVE | Noted: 2022-10-12

## 2022-11-21 PROBLEM — G93.6 BRAIN EDEMA (HCC): Status: ACTIVE | Noted: 2022-10-12

## 2022-11-21 PROBLEM — G93.41 ACUTE METABOLIC ENCEPHALOPATHY: Status: ACTIVE | Noted: 2022-10-12

## 2022-11-21 PROBLEM — C77.1 SECONDARY MALIGNANCY OF MEDIASTINAL LYMPH NODES (HCC): Status: ACTIVE | Noted: 2022-09-30

## 2022-11-21 PROBLEM — C77.0 SECONDARY AND UNSPECIFIED MALIGNANT NEOPLASM OF LYMPH NODES OF HEAD, FACE AND NECK (HCC): Status: ACTIVE | Noted: 2022-09-30
